# Patient Record
Sex: FEMALE | Race: WHITE | Employment: OTHER | URBAN - METROPOLITAN AREA
[De-identification: names, ages, dates, MRNs, and addresses within clinical notes are randomized per-mention and may not be internally consistent; named-entity substitution may affect disease eponyms.]

---

## 2017-04-05 ENCOUNTER — PROCEDURE VISIT (OUTPATIENT)
Dept: SURGERY | Age: 67
End: 2017-04-05

## 2017-04-05 VITALS
TEMPERATURE: 97.9 F | OXYGEN SATURATION: 97 % | WEIGHT: 193 LBS | DIASTOLIC BLOOD PRESSURE: 82 MMHG | HEART RATE: 67 BPM | SYSTOLIC BLOOD PRESSURE: 132 MMHG

## 2017-04-05 DIAGNOSIS — C44.01 BASAL CELL CARCINOMA OF UPPER LIP: Primary | ICD-10-CM

## 2017-04-05 PROCEDURE — 17311 MOHS 1 STAGE H/N/HF/G: CPT | Performed by: DERMATOLOGY

## 2017-04-05 PROCEDURE — 14060 TIS TRNFR E/N/E/L 10 SQ CM/<: CPT | Performed by: DERMATOLOGY

## 2017-04-05 RX ORDER — ZOLPIDEM TARTRATE 5 MG/1
5 TABLET ORAL
Status: ON HOLD | COMMUNITY
End: 2022-07-07 | Stop reason: ALTCHOICE

## 2017-04-05 RX ORDER — CITALOPRAM 20 MG/1
20 TABLET ORAL
COMMUNITY

## 2017-04-05 RX ORDER — IBUPROFEN 600 MG/1
TABLET ORAL
Refills: 0 | COMMUNITY
Start: 2017-01-10

## 2017-04-06 ENCOUNTER — TELEPHONE (OUTPATIENT)
Dept: SURGERY | Age: 67
End: 2017-04-06

## 2017-04-10 ENCOUNTER — TELEPHONE (OUTPATIENT)
Dept: SURGERY | Age: 67
End: 2017-04-10

## 2017-04-11 ENCOUNTER — OFFICE VISIT (OUTPATIENT)
Dept: SURGERY | Age: 67
End: 2017-04-11

## 2017-04-11 DIAGNOSIS — Z48.02 VISIT FOR SUTURE REMOVAL: Primary | ICD-10-CM

## 2017-04-11 PROCEDURE — 99024 POSTOP FOLLOW-UP VISIT: CPT | Performed by: DERMATOLOGY

## 2022-07-01 NOTE — PROGRESS NOTES
procedure(s) or different procedure(s) than those set forth in Paragraph 1. I (we) authorize and request that the above-named physician, his/her assistants or his/her designees, perform procedures as necessary and desirable if deemed to be in my (our) best interest.           Revised 8/2/2021                                                                          Page 1 of 2       3. I acknowledge that health care personnel may be observing this procedure for the purpose of medical education or other specified purposes as may be necessary as requested and/or approved by my (our) physician. 4. I (we) consent to the disposal by the hospital Pathologist of the removed tissue, parts or organs in accordance with hospital policy. 5. I do ____ do not ____ consent to the use of a local infiltration pain blocking agent that will be used by my provider/surgical provider to help alleviate pain during my procedure. 6. I do ____ do not ____ consent to an emergent blood transfusion in the case of a life-threatening situation that requires blood components to be administered. This consent is valid for 24 hours from the beginning of the procedure. 7. This patient does ____ or does not ____ currently have a DNR status/order. If DNR order is in place, obtain Addendum to the Surgical Consent for ALL Patients with a DNR Order to address rosi-operative status for limited intervention or DNR suspension.      8. I have read and fully understand the above Consent for Operation/Procedure and that all blanks were completed before I signed the consent.   _____________________________       _____________________      ____/____am/pm  Signature of Patient or legal representative      Printed Name / Relationship            Date / Time   ____________________________       _____________________      ____/____am/pm  Witness to Signature                                    Printed Name                    Date / Time     If patient is unable to sign or is a minor, complete the following)  Patient is a minor, ____ years of age, or unable to sign because:   ______________________________________________________________________________________________    [place] If a phone consent is obtained, consent will be documented by using two health care professionals, each affirming that the consenting party has no questions and gives consent for the procedure discussed with the physician/provider.   _____________________          ____________________       _____/_____am/pm   2nd witness to phone consent        Printed name           Date / Time    Informed Consent:  I have provided the explanation described above in section 1 to the patient and/or legal representative.  I have provided the patient and/or legal representative with an opportunity to ask any questions about the proposed operation/procedure.   ___________________________          ____________________         ____/____am/pm  Provider / Proceduralist                            Printed name            Date / Time  Revised 8/2/2021                                                                      Page 2 of 2

## 2022-07-01 NOTE — PROGRESS NOTES
PRE-OP INSTRUCTIONS FOR THE SURGICAL PATIENT YOU ARE UNABLE TO MAKE CONTACT FOR AN INTERVIEW:        1. Follow all instructions provided to you from your surgeons office, including your ARRIVAL TIME. 2. Enter the MAIN entrance located on [address] and report to the desk. 3. Bring your insurance & photo ID with you. You may also be asked to pay a co-pay, as you may want to bring a check or credit card with you. 4. Leave all other valuables at home. 5. Arrange for someone to drive you home and be with you for the first 24 hours after discharge. 6. Bring your medication list with you day of surgery with doses and frequency listed (including over the counter medications)  7. You must contact your surgeon for Instructions regarding:              - ALL medication instructions, especially if taking blood thinners, aspirin, or diabetic medication.         -Bariatric patients call surgeon if on diabetic medications as some need to be stopped 1 week preop  - IF  there is a change in your physical condition such as a cold, fever, rash, cuts, sores or any other infection, especially near your surgical site. 8. A Pre-op History and Physical for surgery MUST be completed by your Physician or an Urgent Care within 30 days of your procedure date. Please bring a copy with you on the day of your procedure and along with any other testing performed. 9. DO NOT EAT ANYTHING eight hours prior to arrival for surgery. May have up to 8 ounces of water 4 hours prior to arrival for surgery. NOTE: ALL Gastric, Bariatric and Bowel surgery patients MUST follow their surgeon's instructions. 10. No gum, candy, mints, or ice chips day of procedure. 11. Please refrain from drinking alcohol the day before or day of your procedure. 12. Please do not smoke the day of your procedure. 13. Dress in loose, comfortable clothing appropriate for redressing after your procedure.  Do not wear jewelry (including body piercings), make-up, fingernail polish, lotion, powders or metal hairclips. 15. Contacts will need to be removed prior to surgery. You may want to bring your eye glasses to wear immediately before and after surgery. 14. Dentures will need to be removed before your procedure. 13. Bring cases for your glasses, contacts, dentures, or hearing aids to protect them while you are in surgery. 16. If you use a CPAP, please bring it with you on the day of your procedure. 17. Do not shave or wax for 72 hours prior to procedure near your operative site  18. FOR WOMAN OF CHILDBEARING AGE ONLY- please make sure we can collect a urine sample on arrival.     If you have further questions, you may contact your surgeon's office or us at [phone]     Left instructions on patient's voicemail.     Martin Mckeon RN.7/1/2022 .4:44 PM

## 2022-07-06 ENCOUNTER — ANESTHESIA EVENT (OUTPATIENT)
Dept: OPERATING ROOM | Age: 72
End: 2022-07-06
Payer: COMMERCIAL

## 2022-07-07 ENCOUNTER — HOSPITAL ENCOUNTER (OUTPATIENT)
Age: 72
Setting detail: OUTPATIENT SURGERY
Discharge: HOME | End: 2022-07-07
Attending: PODIATRIST | Admitting: PODIATRIST
Payer: COMMERCIAL

## 2022-07-07 ENCOUNTER — ANESTHESIA (OUTPATIENT)
Dept: OPERATING ROOM | Age: 72
End: 2022-07-07
Payer: COMMERCIAL

## 2022-07-07 VITALS
RESPIRATION RATE: 18 BRPM | TEMPERATURE: 97.9 F | WEIGHT: 201.8 LBS | HEART RATE: 80 BPM | BODY MASS INDEX: 31.67 KG/M2 | SYSTOLIC BLOOD PRESSURE: 135 MMHG | OXYGEN SATURATION: 95 % | HEIGHT: 67 IN | DIASTOLIC BLOOD PRESSURE: 80 MMHG

## 2022-07-07 DIAGNOSIS — D17.24 LIPOMA OF LEFT LOWER EXTREMITY: ICD-10-CM

## 2022-07-07 DIAGNOSIS — G57.62 LESION OF LEFT PLANTAR NERVE: ICD-10-CM

## 2022-07-07 DIAGNOSIS — M79.672 LEFT FOOT PAIN: ICD-10-CM

## 2022-07-07 DIAGNOSIS — G89.18 POST-OP PAIN: Primary | ICD-10-CM

## 2022-07-07 PROCEDURE — 7100000000 HC PACU RECOVERY - FIRST 15 MIN: Performed by: PODIATRIST

## 2022-07-07 PROCEDURE — 2500000003 HC RX 250 WO HCPCS: Performed by: PODIATRIST

## 2022-07-07 PROCEDURE — 3700000000 HC ANESTHESIA ATTENDED CARE: Performed by: PODIATRIST

## 2022-07-07 PROCEDURE — 2580000003 HC RX 258: Performed by: NURSE ANESTHETIST, CERTIFIED REGISTERED

## 2022-07-07 PROCEDURE — 3600000014 HC SURGERY LEVEL 4 ADDTL 15MIN: Performed by: PODIATRIST

## 2022-07-07 PROCEDURE — 2580000003 HC RX 258: Performed by: ANESTHESIOLOGY

## 2022-07-07 PROCEDURE — 3700000001 HC ADD 15 MINUTES (ANESTHESIA): Performed by: PODIATRIST

## 2022-07-07 PROCEDURE — 6370000000 HC RX 637 (ALT 250 FOR IP): Performed by: PODIATRIST

## 2022-07-07 PROCEDURE — 3600000004 HC SURGERY LEVEL 4 BASE: Performed by: PODIATRIST

## 2022-07-07 PROCEDURE — 7100000011 HC PHASE II RECOVERY - ADDTL 15 MIN: Performed by: PODIATRIST

## 2022-07-07 PROCEDURE — 7100000010 HC PHASE II RECOVERY - FIRST 15 MIN: Performed by: PODIATRIST

## 2022-07-07 PROCEDURE — 88304 TISSUE EXAM BY PATHOLOGIST: CPT

## 2022-07-07 PROCEDURE — 2709999900 HC NON-CHARGEABLE SUPPLY: Performed by: PODIATRIST

## 2022-07-07 PROCEDURE — 7100000001 HC PACU RECOVERY - ADDTL 15 MIN: Performed by: PODIATRIST

## 2022-07-07 PROCEDURE — 6360000002 HC RX W HCPCS: Performed by: ANESTHESIOLOGY

## 2022-07-07 PROCEDURE — 2500000003 HC RX 250 WO HCPCS: Performed by: NURSE ANESTHETIST, CERTIFIED REGISTERED

## 2022-07-07 PROCEDURE — 6360000002 HC RX W HCPCS: Performed by: NURSE ANESTHETIST, CERTIFIED REGISTERED

## 2022-07-07 PROCEDURE — 6360000002 HC RX W HCPCS: Performed by: PODIATRIST

## 2022-07-07 PROCEDURE — 6370000000 HC RX 637 (ALT 250 FOR IP): Performed by: ANESTHESIOLOGY

## 2022-07-07 RX ORDER — PROPOFOL 10 MG/ML
INJECTION, EMULSION INTRAVENOUS PRN
Status: DISCONTINUED | OUTPATIENT
Start: 2022-07-07 | End: 2022-07-07 | Stop reason: SDUPTHER

## 2022-07-07 RX ORDER — APREPITANT 40 MG/1
40 CAPSULE ORAL ONCE
Status: COMPLETED | OUTPATIENT
Start: 2022-07-07 | End: 2022-07-07

## 2022-07-07 RX ORDER — ONDANSETRON 2 MG/ML
4 INJECTION INTRAMUSCULAR; INTRAVENOUS
Status: CANCELLED | OUTPATIENT
Start: 2022-07-07 | End: 2022-07-07

## 2022-07-07 RX ORDER — MEPERIDINE HYDROCHLORIDE 25 MG/ML
12.5 INJECTION INTRAMUSCULAR; INTRAVENOUS; SUBCUTANEOUS EVERY 5 MIN PRN
Status: CANCELLED | OUTPATIENT
Start: 2022-07-07

## 2022-07-07 RX ORDER — CEFAZOLIN SODIUM 2 G/50ML
SOLUTION INTRAVENOUS PRN
Status: DISCONTINUED | OUTPATIENT
Start: 2022-07-07 | End: 2022-07-07 | Stop reason: SDUPTHER

## 2022-07-07 RX ORDER — GINSENG 100 MG
CAPSULE ORAL PRN
Status: DISCONTINUED | OUTPATIENT
Start: 2022-07-07 | End: 2022-07-07 | Stop reason: HOSPADM

## 2022-07-07 RX ORDER — PROCHLORPERAZINE EDISYLATE 5 MG/ML
5 INJECTION INTRAMUSCULAR; INTRAVENOUS
Status: CANCELLED | OUTPATIENT
Start: 2022-07-07 | End: 2022-07-07

## 2022-07-07 RX ORDER — ONDANSETRON 2 MG/ML
INJECTION INTRAMUSCULAR; INTRAVENOUS PRN
Status: DISCONTINUED | OUTPATIENT
Start: 2022-07-07 | End: 2022-07-07 | Stop reason: SDUPTHER

## 2022-07-07 RX ORDER — SODIUM CHLORIDE 0.9 % (FLUSH) 0.9 %
5-40 SYRINGE (ML) INJECTION PRN
Status: CANCELLED | OUTPATIENT
Start: 2022-07-07

## 2022-07-07 RX ORDER — SODIUM CHLORIDE 0.9 % (FLUSH) 0.9 %
5-40 SYRINGE (ML) INJECTION EVERY 12 HOURS SCHEDULED
Status: CANCELLED | OUTPATIENT
Start: 2022-07-07

## 2022-07-07 RX ORDER — TRIAMCINOLONE ACETONIDE 40 MG/ML
INJECTION, SUSPENSION INTRA-ARTICULAR; INTRAMUSCULAR PRN
Status: DISCONTINUED | OUTPATIENT
Start: 2022-07-07 | End: 2022-07-07 | Stop reason: HOSPADM

## 2022-07-07 RX ORDER — GLYCOPYRROLATE 0.2 MG/ML
INJECTION INTRAMUSCULAR; INTRAVENOUS PRN
Status: DISCONTINUED | OUTPATIENT
Start: 2022-07-07 | End: 2022-07-07 | Stop reason: SDUPTHER

## 2022-07-07 RX ORDER — LIDOCAINE HYDROCHLORIDE 20 MG/ML
INJECTION, SOLUTION INFILTRATION; PERINEURAL PRN
Status: DISCONTINUED | OUTPATIENT
Start: 2022-07-07 | End: 2022-07-07 | Stop reason: SDUPTHER

## 2022-07-07 RX ORDER — SODIUM CHLORIDE 9 MG/ML
INJECTION, SOLUTION INTRAVENOUS PRN
Status: CANCELLED | OUTPATIENT
Start: 2022-07-07

## 2022-07-07 RX ORDER — LIDOCAINE HYDROCHLORIDE AND EPINEPHRINE BITARTRATE 20; .01 MG/ML; MG/ML
INJECTION, SOLUTION SUBCUTANEOUS PRN
Status: DISCONTINUED | OUTPATIENT
Start: 2022-07-07 | End: 2022-07-07 | Stop reason: HOSPADM

## 2022-07-07 RX ORDER — DEXAMETHASONE SODIUM PHOSPHATE 4 MG/ML
INJECTION, SOLUTION INTRA-ARTICULAR; INTRALESIONAL; INTRAMUSCULAR; INTRAVENOUS; SOFT TISSUE PRN
Status: DISCONTINUED | OUTPATIENT
Start: 2022-07-07 | End: 2022-07-07 | Stop reason: SDUPTHER

## 2022-07-07 RX ORDER — SODIUM CHLORIDE, SODIUM LACTATE, POTASSIUM CHLORIDE, CALCIUM CHLORIDE 600; 310; 30; 20 MG/100ML; MG/100ML; MG/100ML; MG/100ML
INJECTION, SOLUTION INTRAVENOUS CONTINUOUS PRN
Status: DISCONTINUED | OUTPATIENT
Start: 2022-07-07 | End: 2022-07-07 | Stop reason: SDUPTHER

## 2022-07-07 RX ORDER — SODIUM CHLORIDE, SODIUM LACTATE, POTASSIUM CHLORIDE, CALCIUM CHLORIDE 600; 310; 30; 20 MG/100ML; MG/100ML; MG/100ML; MG/100ML
INJECTION, SOLUTION INTRAVENOUS CONTINUOUS
Status: DISCONTINUED | OUTPATIENT
Start: 2022-07-07 | End: 2022-07-07 | Stop reason: HOSPADM

## 2022-07-07 RX ORDER — SCOLOPAMINE TRANSDERMAL SYSTEM 1 MG/1
1 PATCH, EXTENDED RELEASE TRANSDERMAL
Status: DISCONTINUED | OUTPATIENT
Start: 2022-07-07 | End: 2022-07-07 | Stop reason: HOSPADM

## 2022-07-07 RX ORDER — HYDRALAZINE HYDROCHLORIDE 20 MG/ML
10 INJECTION INTRAMUSCULAR; INTRAVENOUS
Status: CANCELLED | OUTPATIENT
Start: 2022-07-07

## 2022-07-07 RX ORDER — PROMETHAZINE HYDROCHLORIDE 25 MG/1
25 TABLET ORAL EVERY 6 HOURS PRN
Qty: 10 TABLET | Refills: 0 | Status: SHIPPED | OUTPATIENT
Start: 2022-07-07

## 2022-07-07 RX ORDER — DEXMEDETOMIDINE HYDROCHLORIDE 100 UG/ML
INJECTION, SOLUTION INTRAVENOUS PRN
Status: DISCONTINUED | OUTPATIENT
Start: 2022-07-07 | End: 2022-07-07 | Stop reason: SDUPTHER

## 2022-07-07 RX ORDER — LABETALOL HYDROCHLORIDE 5 MG/ML
10 INJECTION, SOLUTION INTRAVENOUS
Status: CANCELLED | OUTPATIENT
Start: 2022-07-07

## 2022-07-07 RX ORDER — FENTANYL CITRATE 50 UG/ML
INJECTION, SOLUTION INTRAMUSCULAR; INTRAVENOUS PRN
Status: DISCONTINUED | OUTPATIENT
Start: 2022-07-07 | End: 2022-07-07 | Stop reason: SDUPTHER

## 2022-07-07 RX ORDER — FENTANYL CITRATE 50 UG/ML
25 INJECTION, SOLUTION INTRAMUSCULAR; INTRAVENOUS EVERY 5 MIN PRN
Status: CANCELLED | OUTPATIENT
Start: 2022-07-07

## 2022-07-07 RX ORDER — BUPIVACAINE HYDROCHLORIDE 5 MG/ML
INJECTION, SOLUTION EPIDURAL; INTRACAUDAL PRN
Status: DISCONTINUED | OUTPATIENT
Start: 2022-07-07 | End: 2022-07-07 | Stop reason: HOSPADM

## 2022-07-07 RX ORDER — HYDROCODONE BITARTRATE AND ACETAMINOPHEN 5; 325 MG/1; MG/1
1 TABLET ORAL EVERY 6 HOURS PRN
Qty: 20 TABLET | Refills: 0 | Status: SHIPPED | OUTPATIENT
Start: 2022-07-07 | End: 2022-07-12

## 2022-07-07 RX ADMIN — GLYCOPYRROLATE 0.2 MG: 0.2 INJECTION INTRAMUSCULAR; INTRAVENOUS at 08:05

## 2022-07-07 RX ADMIN — SODIUM CHLORIDE, SODIUM LACTATE, POTASSIUM CHLORIDE, AND CALCIUM CHLORIDE: .6; .31; .03; .02 INJECTION, SOLUTION INTRAVENOUS at 08:26

## 2022-07-07 RX ADMIN — DEXMEDETOMIDINE HYDROCHLORIDE 6 MCG: 100 INJECTION, SOLUTION INTRAVENOUS at 07:37

## 2022-07-07 RX ADMIN — ONDANSETRON 4 MG: 2 INJECTION INTRAMUSCULAR; INTRAVENOUS at 07:46

## 2022-07-07 RX ADMIN — CEFAZOLIN SODIUM 2000 MG: 2 SOLUTION INTRAVENOUS at 07:46

## 2022-07-07 RX ADMIN — FENTANYL CITRATE 50 MCG: 50 INJECTION, SOLUTION INTRAMUSCULAR; INTRAVENOUS at 07:41

## 2022-07-07 RX ADMIN — FENTANYL CITRATE 50 MCG: 50 INJECTION, SOLUTION INTRAMUSCULAR; INTRAVENOUS at 07:52

## 2022-07-07 RX ADMIN — SODIUM CHLORIDE, POTASSIUM CHLORIDE, SODIUM LACTATE AND CALCIUM CHLORIDE: 600; 310; 30; 20 INJECTION, SOLUTION INTRAVENOUS at 06:39

## 2022-07-07 RX ADMIN — APREPITANT 40 MG: 40 CAPSULE ORAL at 07:08

## 2022-07-07 RX ADMIN — PROPOFOL 200 MG: 10 INJECTION, EMULSION INTRAVENOUS at 07:41

## 2022-07-07 RX ADMIN — SODIUM CHLORIDE, SODIUM LACTATE, POTASSIUM CHLORIDE, AND CALCIUM CHLORIDE: .6; .31; .03; .02 INJECTION, SOLUTION INTRAVENOUS at 07:16

## 2022-07-07 RX ADMIN — DEXAMETHASONE SODIUM PHOSPHATE 4 MG: 4 INJECTION, SOLUTION INTRAMUSCULAR; INTRAVENOUS at 07:46

## 2022-07-07 RX ADMIN — LIDOCAINE HYDROCHLORIDE 100 MG: 20 INJECTION, SOLUTION INFILTRATION; PERINEURAL at 07:41

## 2022-07-07 RX ADMIN — DEXMEDETOMIDINE HYDROCHLORIDE 4 MCG: 100 INJECTION, SOLUTION INTRAVENOUS at 07:24

## 2022-07-07 ASSESSMENT — PAIN SCALES - GENERAL
PAINLEVEL_OUTOF10: 0

## 2022-07-07 ASSESSMENT — PAIN - FUNCTIONAL ASSESSMENT: PAIN_FUNCTIONAL_ASSESSMENT: 0-10

## 2022-07-07 NOTE — OP NOTE
[address]               [address]                                OPERATIVE REPORT    PATIENT NAME: Sally Caldwell                     :        1950  MED REC NO:   [id number]                          ROOM:  ACCOUNT NO:   [de-identified]                           ADMIT DATE: 2022  PROVIDER:     Serg Brenner DPM    DATE OF PROCEDURE:  2022    SURGEON:  Serg Brenner DPM    ASSISTANT:  Stefani Smart DPM    PREOPERATIVE DIAGNOSES:  1. Suspected lipoma, left ankle. 2.  Painful neoplasm lipoma, left ankle. 3.  Superficial peroneal and distal superficial lateral cutaneous nerve  branch neuritis neuralgia. 4.  History of osteoarthritis. 5.  History of capsulitis, tendinitis, all left lower extremity. POSTOPERATIVE DIAGNOSES:  1. Suspected lipoma, left ankle. 2.  Painful neoplasm lipoma, left ankle. 3.  Superficial peroneal and distal superficial lateral cutaneous nerve  branch neuritis neuralgia. 4.  History of osteoarthritis. 5.  History of capsulitis, tendinitis, all left lower extremity. PROCEDURES:  1. Excision of suspected lipoma, left ankle. 2.  Superficial peroneal nerve distal branches along the lateral dorsal  cutaneous nerve branches, neurolysis nerve decompression, left limb and  ankle. 3.  Injection, sinus tarsi, midfoot, lateral column, all left foot. ANESTHESIA:  Local with general.    HEMOSTASIS:  Anatomic dissection. EBL:  Less than  5 mL, replaced by fluids. MATERIALS:  4-0 Vicryl, 4-0 Monocryl, and 4-0 nylon. INJECTABLES:  2 mL of Kenalog and 30 mL of 0.5% Marcaine plain. TOURNIQUET:  Tourniquet was utilized on the thigh 300 mmHg at 28  minutes. CONSENT:  Female consented for surgical intervention. All risks,  complications, and benefits were described to the patient. No  guarantees were given in light of the patient's comorbidities.   The  patient understands and agrees, elects to proceed with surgery. INDICATIONS FOR PROCEDURE:  The patient has a longstanding area on her  left lateral ankle with pain to palpation along the ankle and lipoma  region. Imaging modalities, i.e., ultrasound demonstrated localized  region of circumscribed adipose tissue that also combined with clinical  superior superficial peroneal nerve blocks. She did have considerable  resolution of her pain with this being said and treating all  conservative modalities of care, the patient would opt for surgical  intervention of this area. Also it is important to note that the  patient does have elements of mechanical and structural insult. She has  arthritic changes noted and they are minor to moderate, essentially in  the subtalar joints, sinus tarsi, and midfoot as well. The patient also  has _____ gait, which does encourage mechanical insult to the area  causing elements of capsulitis, myositis, tendinopathies. Also the  patient has a significantly arthritic left knee, which she is planning  for total knee replacement as well, which is contributing also to gait  pattern and possible secondary involvement for proliferation of pain. All this being said, the patient knows she has a multifactorial element  or elements to her discomfort and this was one element that can  hopefully relieve and decrease some of her pain threshold. She  understands this and elects to proceed with surgery. DESCRIPTION OF PROCEDURE:  This female was brought in the room from the  preoperative holding area, placed on operating table in a supine  position. After IV sedation was induced, local anesthetic was  administered to the patient's left ankle consisting of 10 mL of 2%  lidocaine plain. Left ankle was scrubbed, prepped, and draped in the  usual sterile manner and preparation was made for procedure.     EXCISION OF SUSPECTED LIPOMA, LEFT ANKLE:  Attention was turned to the  left ankle where a linear incision was created over the dorsal  dorsolateral ankle region avoiding neurovascular structures and in  between tendon complexes. Medially, once incised into the adequate  level, there was a well circumscribed adipose appearing lipoma material,  roughly 1.5 x 1.5 cm2. We delicately dissected to the clean margins in  the area down over the superficial to the fascial structures deep  exploring the entire dissection planes in its _____ perspective looking  for the dorsal cutaneous nerve branches. We did find an element of this  branch most lateral to the suspected lipoma. At that point, it was not  entwined within the lipoma, we simply performed a neurolysis  decompression and it will be outlined in the next procedure. Very  delicately removing these areas and all margins and then Bovied and cut  any small venules or veins of the area, which were amenable. The lipoma  was excised in toto and passed from surgical field and sent to Pathology  for identification again, which had clinical appearance of a adipose  lipoma. DISTAL SUPERFICIAL PERONEAL NERVE-SUPERIOR LATERAL DORSAL CUTANEOUS  NERVE BRANCH NEUROLYSIS DECOMPRESSION, LEFT ANKLE:  Attention was  directed to the left ankle. Through the same incision, the lateral most  portion of the small nerve branches were discovered and they appeared to  be in good health and integrity and intact. We did not perform any  neurectomies. Simply, a nerve decompression neurolysis of this area  leaving it out of harm's way after the lipoma was excised in toto. The  area was then flushed copiously with irrigant solution. Next, we  sutured the subcu layers with 4-0 Vicryl, cuticular layers with 4-0  Monocryl, and then 4-0 nylon retention sutures.     Once we sutured this area, we then injected a total of 5 mL of a 2 mL  Kenalog, 3 mL 0.5% Marcaine plain mixed roughly 2 mL into the sinus  tarsi, and a combination of 2 to 3 mL around the lateral column  calcaneocuboid area along with the cuneiform base of metatarsal area  along with subextensor digitorum brevis muscle belly and around  insertion areas of the peroneus brevis complex of the styloid process  simply to fenestrate a diluted steroid content to see if we can decrease  any inflammation of these particular anatomic structures. This was all  done with a 25-gauge needle as well. After which, we cleaned our areas,  injected another 25 mL 0.5% Marcaine plain in and around the surgical  site. Dressings were applied; PSO-Adaptic, 4 x 4s, 4 x 8s, Kerlix,  Tian, Webril, Ace, and small Coban strands. The patient tolerated the  procedure and anesthesia well. The tourniquet was again decreased and  prompt instantaneous hyperemic response noted of the left limb. The  patient was safely extubated in the OR, brought to recovery room with  vital signs stable and vascular intact to left limb. Following a period  of postoperative care, the patient will be discharged home with oral and  written instructions.         Tere Buchanan DPM    D: 07/07/2022 9:18:33       T: 07/07/2022 11:15:02     [email or web address]  Job#: [id number]     Doc#: [id number]    CC:

## 2022-07-07 NOTE — PROGRESS NOTES
Brief Postoperative Note      Patient: Scarlet Izquierdo  YOB: 1950  MRN: [id number]    Date of Procedure: 7/7/2022    Pre-Op Diagnosis: Lipoma of left lower extremity [D17.24] Lesion of left plantar nerve [G57.62] Left foot pain [M79.672]    Post-Op Diagnosis: Same       Procedure(s):  LEFT ANKLE RESECTION OF DEEP LIPOMA, LEFT ANKLE NEUROLYSIS NERVE DECOMPRESSION, SUPERFICIAL PERONEAL NERVE  .     Surgeon(s):  Bryan Dominguez DPM    Assistant:  Resident: Shira Mukherjee DPM    Anesthesia: General    Estimated Blood Loss (mL): Minimal    Complications: None    Specimens:   ID Type Source Tests Collected by Time Destination   A : Lipoma Tissue Tissue SURGICAL PATHOLOGY Bryan Dominguez DPM 7/7/2022 0809        Implants:  * No implants in log *      Drains: * No LDAs found *    Findings: lipoma lateral left ankle - sent for path ID     Electronically signed by Toshia Carson DPM on 7/7/2022 at 8:40 AM

## 2022-07-07 NOTE — PROGRESS NOTES
Pt is alert and oriented X4. She wears glasses. Her  Theressa Mortimer took her purse with him. Clothes to locked room. Foot was wiped with CHG wipes before surgery. [facility] Consents signed and on chart.

## 2022-07-07 NOTE — H&P
Joaquim Gray    [id number]    [facility] Same Day Surgery Update H & P  Department of General Surgery   Surgical Service   Pre-operative History and Physical  Last H & P within the last 30 days. DIAGNOSIS:   Lipoma of left lower extremity [D17.24]  Lesion of left plantar nerve [G57.62]  Left foot pain [M79.672]    Procedure(s):  LEFT ANKLE RESECTION OF DEEP LIPOMA, LEFT ANKLE NEUROLYSIS NERVE DECOMPRESSION, SUPERFICIAL PERONEAL NERVE  . History obtained from: Patient interview and EHR      HISTORY OF PRESENT ILLNESS:   The patient is a 67 y.o. female with left foot pain in the setting of lipoma which is compressing the peroneal nerve. Illness Screening: Patient denies fever, chills, worsening cough, or close contact with sick individuals. Past Medical History:        Diagnosis Date    Cancer [facility])     Skin Cancer    Mitral valve disease     PONV (postoperative nausea and vomiting)      Past Surgical History:        Procedure Laterality Date    GANGLION CYST EXCISION      KNEE ARTHROSCOPY Left     X2    MOHS SURGERY  04/05/2017    left upper cutaneous lip       Medications Prior to Admission:      Prior to Admission medications    Medication Sig Start Date End Date Taking?  Authorizing Provider   Multiple Vitamin (MVI, CELEBRATE, CHEWABLE TABLET) Take 1 tablet by mouth    Historical Provider, MD   citalopram (CELEXA) 20 MG tablet Take 20 mg by mouth    Historical Provider, MD   CALCIUM PO Take 1 tablet by mouth 8/11/08   Historical Provider, MD   ibuprofen (ADVIL;MOTRIN) 600 MG tablet TAKE 1 TABLET BY MOUTH EVERY 6 HOURS AS NEEDED FOR PAIN 1/10/17   Historical Provider, MD         Allergies:  Pneumococcal vaccine    PHYSICAL EXAM:      BP (!) 140/73   Pulse 69   Temp 97.7 °F (36.5 °C) (Temporal)   Resp 15   Ht 5' 7\" (1.702 m)   Wt 201 lb 12.8 oz (91.5 kg)   SpO2 94%   Breastfeeding No   BMI 31.61 kg/m²      Airway:  Airway patent with no audible stridor    Heart:  Regular rate

## 2022-07-07 NOTE — PROGRESS NOTES
PACU Transfer to [facility] #6    Procedure(s):  LEFT ANKLE RESECTION OF DEEP LIPOMA, LEFT ANKLE NEUROLYSIS NERVE DECOMPRESSION, SUPERFICIAL PERONEAL NERVE  . Dr Meghna Sanders    Pt's Current Allergies: Pneumococcal vaccine    Pt meets criteria to transfer to next phase of care per Cort Lund[place] and JOSHUA standards    No results for input(s): POCGLU in the last 72 hours. Vitals:    07/07/22 0930   BP: 132/66   Pulse: 78   Resp: 13   Temp: 97.7 °F (36.5 °C)   SpO2: 98%      BP within 20% of pt's admitting BP as per Fatoumata Score      Intake/Output Summary (Last 24 hours) at 7/7/2022 0948  Last data filed at 7/7/2022 0930  Gross per 24 hour   Intake 1347 ml   Output --   Net 1347 ml     Drank diet soda  Ate crackers    Pain assessment:  none  Pain Level: 0  Patient received a BLOCK    Patient was assessed for unknown alterations to skin integrity. There were not unknown alterations observed. Patient transferred to care of Neil Ken RN.  Via handoff sheet  Family updated and directed to [facility] via waiting room staff    7/7/2022 9:48 AM

## 2022-07-07 NOTE — ANESTHESIA PRE PROCEDURE
Department of Anesthesiology  Preprocedure Note       Name:  Eddi Kahn   Age:  67 y.o.  :  1950                                          MRN:  [id number]         Date:  2022      Surgeon: Jenny Recinos):  Leopoldo Like, DPM    Procedure: Procedure(s):  LEFT ANKLE RESECTION OF DEEP LIPOMA, LEFT ANKLE NEUROLYSIS NERVE DECOMPRESSION, SUPERFICIAL PERONEAL NERVE  . Medications prior to admission:   Prior to Admission medications    Medication Sig Start Date End Date Taking? Authorizing Provider   Multiple Vitamin (MVI, CELEBRATE, CHEWABLE TABLET) Take 1 tablet by mouth    Historical Provider, MD   citalopram (CELEXA) 20 MG tablet Take 20 mg by mouth    Historical Provider, MD   CALCIUM PO Take 1 tablet by mouth 08   Historical Provider, MD   ibuprofen (ADVIL;MOTRIN) 600 MG tablet TAKE 1 TABLET BY MOUTH EVERY 6 HOURS AS NEEDED FOR PAIN 1/10/17   Historical Provider, MD       Current medications:    Current Facility-Administered Medications   Medication Dose Route Frequency Provider Last Rate Last Admin    ceFAZolin (ANCEF) 2,000 mg in sodium chloride 0.9 % 50 mL IVPB (mini-bag)  2,000 mg IntraVENous Once Leopoldo Like, DPM        lactated ringers infusion   IntraVENous Continuous Laveta ,  mL/hr at 22 0639 New Bag at 22 9726       Allergies:     Allergies   Allergen Reactions    Pneumococcal Vaccine Other (See Comments) and Rash     Rash, chills, achy   Rash, chills, achy   Rash, chills, achy   Rash, chills, achy          Problem List:    Patient Active Problem List   Diagnosis Code    Basal cell carcinoma of left upper lip C44.01       Past Medical History:        Diagnosis Date    Cancer ([address]     Skin Cancer    Mitral valve disease     PONV (postoperative nausea and vomiting)        Past Surgical History:        Procedure Laterality Date    GANGLION CYST EXCISION      KNEE ARTHROSCOPY Left     X2    MOHS SURGERY  2017    left upper cutaneous lip Social History:    Social History     Tobacco Use    Smoking status: Never Smoker    Smokeless tobacco: Never Used   Substance Use Topics    Alcohol use: Yes     Alcohol/week: 2.0 standard drinks     Types: 2 Glasses of wine per week                                Counseling given: Not Answered      Vital Signs (Current):   Vitals:    07/01/22 1639 07/07/22 0545 07/07/22 0632   BP:  (!) 140/73 137/77   Pulse:  69    Resp:  15    Temp:  97.7 °F (36.5 °C)    TempSrc:  Temporal    SpO2:  94%    Weight: 207 lb (93.9 kg) 201 lb 12.8 oz (91.5 kg)    Height: 5' 7\" (1.702 m) 5' 7\" (1.702 m)                                               BP Readings from Last 3 Encounters:   07/07/22 137/77   04/05/17 132/82       NPO Status: Time of last liquid consumption: 2130                        Time of last solid consumption: 1900                        Date of last liquid consumption: 07/06/22                        Date of last solid food consumption: 07/06/22    BMI:   Wt Readings from Last 3 Encounters:   07/07/22 201 lb 12.8 oz (91.5 kg)   04/05/17 193 lb (87.5 kg)     Body mass index is 31.61 kg/m². CBC: No results found for: WBC, RBC, HGB, HCT, MCV, RDW, PLT    CMP: No results found for: NA, K, CL, CO2, BUN, CREATININE, GFRAA, AGRATIO, LABGLOM, GLUCOSE, GLU, PROT, CALCIUM, BILITOT, ALKPHOS, AST, ALT    POC Tests: No results for input(s): POCGLU, POCNA, POCK, POCCL, POCBUN, POCHEMO, POCHCT in the last 72 hours.     Coags: No results found for: PROTIME, INR, APTT    HCG (If Applicable): No results found for: PREGTESTUR, PREGSERUM, HCG, HCGQUANT     ABGs: No results found for: PHART, PO2ART, UJW5VYT, AHM7PII, BEART, A1AWBSTR     Type & Screen (If Applicable):  No results found for: LABABO, LABRH    Drug/Infectious Status (If Applicable):  No results found for: HIV, HEPCAB    COVID-19 Screening (If Applicable): No results found for: COVID19        Anesthesia Evaluation  Patient summary reviewed and Nursing notes reviewed   history of anesthetic complications: PONV. Airway: Mallampati: III  TM distance: >3 FB   Neck ROM: full  Mouth opening: > = 3 FB   Dental:    (+) implants      Pulmonary:Negative Pulmonary ROS and normal exam  breath sounds clear to auscultation                             Cardiovascular:  Exercise tolerance: good (>4 METS),   (+) hypertension:, valvular problems/murmurs: MVP, murmur, Mitral,         Rhythm: regular  Rate: normal                    Neuro/Psych:   (+) depression/anxiety             GI/Hepatic/Renal:   (+) GERD: no interval change,           Endo/Other:    (+) DiabetesType II DM, well controlled, , : arthritis: OA., .                 Abdominal:       Abdomen: soft. Vascular: negative vascular ROS. Other Findings:           Anesthesia Plan      general     ASA 2       Induction: intravenous. MIPS: Postoperative opioids intended and Prophylactic antiemetics administered. Anesthetic plan and risks discussed with patient. Plan discussed with CRNA.     Attending anesthesiologist reviewed and agrees with Preprocedure content                Harry Galeas DO   7/7/2022

## 2022-07-07 NOTE — PROGRESS NOTES
walker, support person    If you continue to feel the effects of the nerve block for longer than 72 hours- call [facility] at [phone] and ask to speak with the Anesthesiologist on call.

## 2022-07-07 NOTE — PROGRESS NOTES
Ambulatory Surgery/Procedure Discharge Note    Vitals:    07/07/22 1026   BP: 135/80   Pulse: 80   Resp: 18   Temp: 97.9 °F (36.6 °C)   SpO2: 95%       In: 1467 [P.O.:342; I.V.:1125]  Out: -     Restroom use offered before discharge. Yes. Pt denied need to void    Pain assessment:  Pain Level: 0        Patient discharged to home/self care. Patient discharged via wheel chair by transporter to waiting family/S.O.       7/7/2022 11:02 AM Pt and S.O./family states \"ready to go home\". Pt alert and oriented x4. IV removed. Denies N/V or pain. Dressing clean dry and intact. Discharge instructions given to pt and  with pt permission. Pt and  verbalized understanding of all instructions. Left with all belongings, 2 prescriptions, and discharge instructions. , postop shoe and crutches.

## (undated) DEVICE — GLOVE ORTHO 8   MSG9480

## (undated) DEVICE — NEEDLE HYPO 22GA L1.5IN BLK POLYPR HUB S STL REG BVL STR

## (undated) DEVICE — MICRO SAGITTAL BLADE (9.4 X 0.4 X 26.2MM)

## (undated) DEVICE — GLOVE SURG SZ 8 L12IN FNGR THK79MIL GRN LTX FREE

## (undated) DEVICE — TOWEL,OR,DSP,ST,BLUE,DLX,8/PK,10PK/CS: Brand: MEDLINE

## (undated) DEVICE — SUTURE COAT VCRL SZ 4-0 L18IN ABSRB UD L19MM PS-2 1/2 CIR J496G

## (undated) DEVICE — SOLUTION IV 1000ML 0.9% SOD CHL

## (undated) DEVICE — MASTISOL ADHESIVE LIQ 2/3ML

## (undated) DEVICE — BLADE SAW OSCIL FLAT 5.5X18.5X0.4MM

## (undated) DEVICE — GOWN,SIRUS,POLYRNF,BRTHSLV,XL,30/CS: Brand: MEDLINE

## (undated) DEVICE — TOWEL,STOP FLAG GOLD N-W: Brand: MEDLINE

## (undated) DEVICE — SOLUTION INJ LR VISIV 1000ML BG

## (undated) DEVICE — LOOP,VESSEL,MAXI,BLUE,2/PK,STERILE: Brand: MEDLINE

## (undated) DEVICE — COVER,TABLE,HEAVY DUTY,77"X90",STRL: Brand: MEDLINE

## (undated) DEVICE — PODIATRY: Brand: MEDLINE INDUSTRIES, INC.

## (undated) DEVICE — COVER LT HNDL BLU PLAS

## (undated) DEVICE — COVER,MAYO STAND,XL,STERILE: Brand: MEDLINE

## (undated) DEVICE — SOLUTION IRRIG 3000ML 0.9% SOD CHL USP UROMATIC PLAS CONT